# Patient Record
Sex: FEMALE | Race: WHITE | NOT HISPANIC OR LATINO | Employment: OTHER | URBAN - METROPOLITAN AREA
[De-identification: names, ages, dates, MRNs, and addresses within clinical notes are randomized per-mention and may not be internally consistent; named-entity substitution may affect disease eponyms.]

---

## 2017-01-31 ENCOUNTER — COMMUNICATION - HEALTHEAST (OUTPATIENT)
Dept: MIDWIFE SERVICES | Facility: CLINIC | Age: 52
End: 2017-01-31

## 2017-01-31 ENCOUNTER — AMBULATORY - HEALTHEAST (OUTPATIENT)
Dept: MIDWIFE SERVICES | Facility: CLINIC | Age: 52
End: 2017-01-31

## 2017-06-26 ENCOUNTER — COMMUNICATION - HEALTHEAST (OUTPATIENT)
Dept: ADMINISTRATIVE | Facility: CLINIC | Age: 52
End: 2017-06-26

## 2017-07-03 ENCOUNTER — OFFICE VISIT - HEALTHEAST (OUTPATIENT)
Dept: MIDWIFE SERVICES | Facility: CLINIC | Age: 52
End: 2017-07-03

## 2017-07-03 DIAGNOSIS — N93.9 VAGINAL SPOTTING: ICD-10-CM

## 2017-07-03 ASSESSMENT — MIFFLIN-ST. JEOR: SCORE: 1426.53

## 2017-07-05 ENCOUNTER — AMBULATORY - HEALTHEAST (OUTPATIENT)
Dept: MIDWIFE SERVICES | Facility: CLINIC | Age: 52
End: 2017-07-05

## 2017-07-05 DIAGNOSIS — N92.0 INTERMENSTRUAL SPOTTING: ICD-10-CM

## 2017-07-07 ENCOUNTER — HOSPITAL ENCOUNTER (OUTPATIENT)
Dept: ULTRASOUND IMAGING | Facility: CLINIC | Age: 52
Discharge: HOME | End: 2017-07-07
Attending: MIDWIFE

## 2017-07-07 DIAGNOSIS — N92.0 EXCESSIVE AND FREQUENT MENSTRUATION WITH REGULAR CYCLE: ICD-10-CM

## 2017-07-07 DIAGNOSIS — N92.0 INTERMENSTRUAL SPOTTING: ICD-10-CM

## 2017-07-08 ENCOUNTER — COMMUNICATION - HEALTHEAST (OUTPATIENT)
Dept: OBGYN | Facility: CLINIC | Age: 52
End: 2017-07-08

## 2017-07-27 ENCOUNTER — OFFICE VISIT - HEALTHEAST (OUTPATIENT)
Dept: OBGYN | Facility: CLINIC | Age: 52
End: 2017-07-27

## 2017-07-27 DIAGNOSIS — N92.1 METRORRHAGIA: ICD-10-CM

## 2017-07-27 ASSESSMENT — MIFFLIN-ST. JEOR: SCORE: 1435.6

## 2018-01-22 ENCOUNTER — RECORDS - HEALTHEAST (OUTPATIENT)
Dept: LAB | Facility: CLINIC | Age: 53
End: 2018-01-22

## 2018-01-22 LAB
ALBUMIN SERPL-MCNC: 4 G/DL (ref 3.5–5)
ALP SERPL-CCNC: 47 U/L (ref 45–120)
ALT SERPL W P-5'-P-CCNC: 18 U/L (ref 0–45)
ANION GAP SERPL CALCULATED.3IONS-SCNC: 6 MMOL/L (ref 5–18)
AST SERPL W P-5'-P-CCNC: 16 U/L (ref 0–40)
BILIRUB SERPL-MCNC: 0.6 MG/DL (ref 0–1)
BUN SERPL-MCNC: 11 MG/DL (ref 8–22)
CALCIUM SERPL-MCNC: 9.2 MG/DL (ref 8.5–10.5)
CHLORIDE BLD-SCNC: 104 MMOL/L (ref 98–107)
CHOLEST SERPL-MCNC: 226 MG/DL
CO2 SERPL-SCNC: 29 MMOL/L (ref 22–31)
CREAT SERPL-MCNC: 0.7 MG/DL (ref 0.6–1.1)
FASTING STATUS PATIENT QL REPORTED: ABNORMAL
GFR SERPL CREATININE-BSD FRML MDRD: >60 ML/MIN/1.73M2
GLUCOSE BLD-MCNC: 90 MG/DL (ref 70–125)
HDLC SERPL-MCNC: 66 MG/DL
LDLC SERPL CALC-MCNC: 134 MG/DL
LIPASE SERPL-CCNC: 31 U/L (ref 0–52)
POTASSIUM BLD-SCNC: 4.4 MMOL/L (ref 3.5–5)
PROT SERPL-MCNC: 7.2 G/DL (ref 6–8)
SODIUM SERPL-SCNC: 139 MMOL/L (ref 136–145)
TRIGL SERPL-MCNC: 132 MG/DL

## 2018-01-30 ENCOUNTER — RECORDS - HEALTHEAST (OUTPATIENT)
Dept: ADMINISTRATIVE | Facility: OTHER | Age: 53
End: 2018-01-30

## 2018-02-08 ENCOUNTER — HOSPITAL ENCOUNTER (OUTPATIENT)
Dept: NUCLEAR MEDICINE | Facility: CLINIC | Age: 53
Discharge: HOME | End: 2018-02-08
Attending: FAMILY MEDICINE

## 2018-02-08 DIAGNOSIS — R10.10 UPPER ABDOMINAL PAIN: ICD-10-CM

## 2018-04-24 ENCOUNTER — AMBULATORY - HEALTHEAST (OUTPATIENT)
Dept: MIDWIFE SERVICES | Facility: CLINIC | Age: 53
End: 2018-04-24

## 2018-04-24 ENCOUNTER — COMMUNICATION - HEALTHEAST (OUTPATIENT)
Dept: MIDWIFE SERVICES | Facility: CLINIC | Age: 53
End: 2018-04-24

## 2019-04-30 ENCOUNTER — COMMUNICATION - HEALTHEAST (OUTPATIENT)
Dept: MIDWIFE SERVICES | Facility: CLINIC | Age: 54
End: 2019-04-30

## 2019-05-02 ENCOUNTER — COMMUNICATION - HEALTHEAST (OUTPATIENT)
Dept: MIDWIFE SERVICES | Facility: CLINIC | Age: 54
End: 2019-05-02

## 2019-05-02 ENCOUNTER — AMBULATORY - HEALTHEAST (OUTPATIENT)
Dept: OBGYN | Facility: CLINIC | Age: 54
End: 2019-05-02

## 2019-05-02 DIAGNOSIS — B37.31 YEAST INFECTION OF THE VAGINA: ICD-10-CM

## 2019-05-13 ENCOUNTER — COMMUNICATION - HEALTHEAST (OUTPATIENT)
Dept: ADMINISTRATIVE | Facility: CLINIC | Age: 54
End: 2019-05-13

## 2019-06-03 ENCOUNTER — OFFICE VISIT - HEALTHEAST (OUTPATIENT)
Dept: MIDWIFE SERVICES | Facility: CLINIC | Age: 54
End: 2019-06-03

## 2019-06-03 DIAGNOSIS — B37.31 VAGINAL YEAST INFECTION: ICD-10-CM

## 2019-06-03 DIAGNOSIS — Z01.419 ENCOUNTER FOR GYNECOLOGICAL EXAMINATION: ICD-10-CM

## 2019-06-03 DIAGNOSIS — Z23 NEED FOR VACCINATION: ICD-10-CM

## 2019-06-03 LAB
CLUE CELLS: ABNORMAL
TRICHOMONAS, WET PREP: ABNORMAL
YEAST, WET PREP: ABNORMAL

## 2019-06-03 ASSESSMENT — MIFFLIN-ST. JEOR: SCORE: 1421.99

## 2019-06-04 ENCOUNTER — COMMUNICATION - HEALTHEAST (OUTPATIENT)
Dept: MIDWIFE SERVICES | Facility: CLINIC | Age: 54
End: 2019-06-04

## 2019-06-04 LAB
HPV SOURCE: NORMAL
HUMAN PAPILLOMA VIRUS 16 DNA: NEGATIVE
HUMAN PAPILLOMA VIRUS 18 DNA: NEGATIVE
HUMAN PAPILLOMA VIRUS FINAL DIAGNOSIS: NORMAL
HUMAN PAPILLOMA VIRUS OTHER HR: NEGATIVE
SPECIMEN DESCRIPTION: NORMAL

## 2019-12-19 ENCOUNTER — OFFICE VISIT - HEALTHEAST (OUTPATIENT)
Dept: MIDWIFE SERVICES | Facility: CLINIC | Age: 54
End: 2019-12-19

## 2019-12-19 DIAGNOSIS — Z80.0 FAMILY HISTORY OF COLON CANCER: ICD-10-CM

## 2019-12-19 DIAGNOSIS — R10.32 LEFT LOWER QUADRANT PAIN: ICD-10-CM

## 2019-12-19 DIAGNOSIS — G47.09 OTHER INSOMNIA: ICD-10-CM

## 2019-12-19 DIAGNOSIS — Z80.3 FAMILY HISTORY OF BREAST CANCER: ICD-10-CM

## 2019-12-19 ASSESSMENT — MIFFLIN-ST. JEOR: SCORE: 1440.14

## 2019-12-20 ENCOUNTER — HOSPITAL ENCOUNTER (OUTPATIENT)
Dept: ULTRASOUND IMAGING | Facility: CLINIC | Age: 54
Discharge: HOME | End: 2019-12-20
Attending: ADVANCED PRACTICE MIDWIFE

## 2019-12-20 DIAGNOSIS — R10.32 LEFT LOWER QUADRANT PAIN: ICD-10-CM

## 2020-01-02 ENCOUNTER — RECORDS - HEALTHEAST (OUTPATIENT)
Dept: MAMMOGRAPHY | Facility: CLINIC | Age: 55
End: 2020-01-02

## 2020-01-02 DIAGNOSIS — Z80.3 FAMILY HISTORY OF MALIGNANT NEOPLASM OF BREAST: ICD-10-CM

## 2020-02-04 ENCOUNTER — RECORDS - HEALTHEAST (OUTPATIENT)
Dept: ADMINISTRATIVE | Facility: OTHER | Age: 55
End: 2020-02-04

## 2021-01-20 ENCOUNTER — AMBULATORY - HEALTHEAST (OUTPATIENT)
Dept: NURSING | Facility: CLINIC | Age: 56
End: 2021-01-20

## 2021-02-10 ENCOUNTER — AMBULATORY - HEALTHEAST (OUTPATIENT)
Dept: NURSING | Facility: CLINIC | Age: 56
End: 2021-02-10

## 2021-05-28 NOTE — TELEPHONE ENCOUNTER
Name of caller: Patient  Phone number where you may be reached: [phone]  Reason for call: Pt gets her physicals from her PCP but would like to know if she is due for a pap. Pt's next availability is 6/3 and she prefers to see MH.  Best time to call back: any  If we don't reach you, is it okay to leave a detailed message? yes

## 2021-05-28 NOTE — TELEPHONE ENCOUNTER
Pt returning call from [facility]. She is available for a call until 12 pm today and then from 1:30 - 3:30 pm. Please call pt at [phone]

## 2021-05-28 NOTE — TELEPHONE ENCOUNTER
TC: Called Carolyne, 53 yo  in response to her email to Tita Luis requesting a prescription for Diflucan.  It appears as though she sent a request on the  and Tita sent a prescription of Diflucan with 4 refills to her pharmacy - does she need another prescription of Diflucan?  Has she received the original prescription?  Does she have symptoms that she is concerned about and would like to come to clinic to discuss?    Left message for her to call this writer back to clarify.    ANIBAL Lindquist,CNM

## 2021-05-29 NOTE — PROGRESS NOTES
"  Assessment:   External labia itching and discomfort  Aestrogenic cervix and vaginal tissue  Irregular intermenstrual spotting  History of cervical polyps  Vaginal yeast infection  Pap smear collected     Plan:   Wet prep collected and positive for yeast.  Rx for diflucan 150 mg orally to be taken once.  If no relief in symptoms, may repeat dosing in 24-48 hours.    Discussed treatment of labial irritation.  Unsure if it's related to vaginal yeast infection (most likely) or decrease in Estrogen.  Advised trying oral diflucan treatment to see if labial discomfort resolves.  If not, may consider Premarin external vaginal cream 0.5g daily x 2 weeks, then change to twice weekly until resolution of symptoms. Advised waiting to start Premarin until after vaginal yeast is treated to establish if discomfort is resolved.  Pap smear collected, pending results.  Return to PCP for annual wellness care, encouraged Carolyne to check in with CNM if vaginal discomfort is not resolved, and we can revisit trying external estrogen treatment for symptoms.     Tita Smith, ANIBAL,CNM    TT with pt 20 min, >50% TSC and CC.      Subjective:      Irma Aldrich is a 54 y.o. female who presents today for repeat pap smear.  She does see Dr. Darnell Wilson for primary care, and has wellness exams with him.  She desires pap smear today, and would like to discuss generalized vaginal/labial itching and discomfort.  She has a history of recurrent yeast infections, and has treated them successfully with diflucan.  She denies any vaginal itching symptoms currently, she only notes a \"general labia irritation\".  She has not tried any yeast treatment or other topical treatment for labia. She has a regular monthly period, and does report intermenstrual spotting occasionally.  She does have a history of cervical polypectomy.  She denies spotting after IC.  She is in a mutually monogamous relationship with her .  She declines STI testing today.  "     Review of Systems  Pertinent items are noted in HPI.  A 12 point comprehensive review of systems was negative except as noted.      Objective:   External labia appears red and slightly irritated, with small area of excoriation on left labia.   Vaginal tissue is pale pink, with minimal rugae.  Very small amount of white adherent discharge noted on left vaginal wall.  Thin, white discharge noted in posterior fornix. No malodor noted.  SSE reveals cervix midline.  Cervical os and cervix appear aestrogenic, white and friable with pap smear collection. No polyps, lesions or masses noted on cervix.    Bimanual reveals anteverted uterus, non pregnant in size, adnexa appreciated bilaterally and were without masses or lesions.  Negative CMT.    Laboratory:   Wet prep: positive for yeast  Pap and HPV: pending

## 2021-05-31 VITALS — BODY MASS INDEX: 30.66 KG/M2 | HEIGHT: 65 IN | WEIGHT: 184 LBS

## 2021-05-31 VITALS — HEIGHT: 65 IN | BODY MASS INDEX: 30.99 KG/M2 | WEIGHT: 186 LBS

## 2021-06-01 ENCOUNTER — RECORDS - HEALTHEAST (OUTPATIENT)
Dept: LAB | Facility: CLINIC | Age: 56
End: 2021-06-01

## 2021-06-01 LAB
CHOLEST SERPL-MCNC: 220 MG/DL
FASTING STATUS PATIENT QL REPORTED: NO
HDLC SERPL-MCNC: 68 MG/DL
LDLC SERPL CALC-MCNC: 123 MG/DL
TRIGL SERPL-MCNC: 147 MG/DL

## 2021-06-02 VITALS — HEIGHT: 65 IN | BODY MASS INDEX: 30.49 KG/M2 | WEIGHT: 183 LBS

## 2021-06-03 VITALS
DIASTOLIC BLOOD PRESSURE: 70 MMHG | BODY MASS INDEX: 31.16 KG/M2 | SYSTOLIC BLOOD PRESSURE: 100 MMHG | HEIGHT: 65 IN | HEART RATE: 64 BPM | WEIGHT: 187 LBS

## 2021-06-04 NOTE — PROGRESS NOTES
Office Visit          Assessment/Plan:  1)  55 yo  with LLQ pain x 4 days  -Discussed that the timing of her pain could be mittelschmerz, however Carolyne has never had Mittelschermz before.   -Carolyne does not recall which side she had her ectopic and adhesion removal   -Discussed that Carolyne could be at risk for ovarian cancer given her family hx of breast cancer.   -Pelvic US ordered to evaluate LLQ pain and ovary for abnormal masses.   2)  Family hx of colon cancer, Carolyne is not sure when she last had a colonoscopy. Order placed for a colonoscopy. She has family hx of colon cancer needs colonoscopy ever 5 years.   3.) Irregular menstrual cycles: Discussed the normal progression with menstrual cycles in perimenopause. Emphasized the importance tracking her bleeding carefully and to let us know if she has any intramenstrual spotting. If this is the case she will need an endometrial biopsy.   -reviewed normal s/sx of perimenopause/menopause  4.) Insomnia: Reviewed sleep hygiene and recommendations to promote healthy sleep habits. Carolyne will continue with melatonin during her perimenopausal transition, then see if she still needs it.   5.) Family hx of breast cancer: MGM dx with breast cancer in her 30's and . Carolyne's last mammogram was in 2016. Advised that she get a mammogram. Order placed.     Subjective:    Irma is a 54 y.o. female who presents for evaluation of left lower quadrant pain.    LMP: 19, heavy spotting for a few days. Carolyne reports her cycles have become more irregular this last year. She has not been tracking her cycles and is not sure if she is having any intramenstrual spotting.   Sister 51 is going through perimenopause, she is having hot flashes  Carolyne developed insomnia a year ago, she is taking melatonin for this, which has been effective.   Carolyne denies any family hx of ovarian cancer  Carolyne has a history of an ectopic and had a surgery to remove multiple adhesions. She doesn't  "remember which side it is on.  Maternal aunt x with colon cancer in her 40's and . Carolyne does not remember when she last had a colonoscopy  MGM developed breast cancer in her 30's and . Carolyne's last mammogram was , she is over due for her mammogram  Sees BRADEN Wilson for her annual examinatons  Currently sexually active with one partner x 30 years, not concerned re STDs  Carolyne developed left lower quadrant pain that is constant and worse with palpation for the last 4 days. Of note she is 2 weeks post LMP (timing of ovulation). She has never had ovulatory pain before and is concerned. She reports normal bowel movements. Denies abnormal vaginal discharge, itching, irritation, odor, dyspareunia, or dysuria.      Review of Systems:  Also a 12 point comprehensive review of systems was negative except as noted.     Objective:   Vitals:    Vitals:    19 0903   BP: 100/70   Pulse: 64   Weight: 187 lb (84.8 kg)   Height: 5' 4.75\" (1.645 m)       Physical Exam:  General: Pleasant, articulate, well-groomed, well-nourished female.  Not in any apparent distress.  Neck: Supple.  Lungs:  nonlabored breathing pattern.  Abdomen: Soft, no masses, negative CVAT. Pain in LLQ with deep palpation   External genitalia: Normal hair distribution, no lesions.  Urethral opening: Without lesions, or tenderness.   Bladder: Without masses, or tenderness.  Vagina: Pink, rugated, normal-appearing discharge.  Cervix: two nabothian cysts at 12 and 2 o'clock, approximately 4mm wide, pink, smooth.   Bimanual: Finds uterus small, mobile, nontender, no masses.  Negative CMT.  Adnexa: R: no masses palpated, no tenderness; L: slightly more full, no distinct mass palpated, tender.    Lower extremities: +1 reflexes, no significant edema.        TT = 45 minutes of time of which >50% on education/counseling/care-coordination   "

## 2021-06-08 NOTE — PROGRESS NOTES
Patient called with complaint of yeast infection.  No changes in medications, would like oral treatment for yeast. Rx faxed to pharmacy.

## 2021-06-11 NOTE — PROGRESS NOTES
"A: Intermenstrual spotting      Cervical lesions      Nabothian cyst    P:  Discussed concerns with lesions and symptoms.  Strongly encouraged patient to rule out STI infection, although risk for patient is very low, she does consent to GC/CT which was collected via dirty urine specimen.  Referral placed for Dr. Mccain to evaluate cervical appearance and treat pending diagnosis.  Patient agrees with plan, given phone number to schedule appointment with Dr. Mccain.    S: Carolyne presents today for evaluation of irregular vaginal spotting that began approximately 2 months ago.  She has regular periods every 30 days, lasting for 4 days.  For the past two months she has spotted for 7-10 days between periods with slight increase in spotting after intercourse.  The spotting is brown in color, and not enough to soak a pad.  She denies cramping with spotting.  She currently uses condoms for birth control.  She is a monogamous relationship with her .  She has history of chlamydia \"many years ago\".  She is a .  Her children are 15 and 17 years old.  She had an ectopic pregnancy in  and underwent a left salpingectomy.  She had a myomectomy in .  In , she had an ultrasound to evaluate IUD placement, and an incidental finding of clustered cystic lesions without vascularity in the endocervical canal (possible nabothian cyst) was noted.  In , she also had a polypectomy.  Pathology showed endocervical polyp with focal erosion and chronic acute cervicitis.  Her pap smear history is as follows:  Negative pap; 2011 Negative pap, negative HPV; 14 Negative pap, Negative HPV.    O:  Speculum exam reveals cervix to be multiparous and pink, glistening.  No cervical discharge or vaginal discharge is noted.  There are 3 lesions noted.  Lesions appear bright red, and do not valerie with pressure.  Cotton swab used to cleanse cervix, and brown discharge noted when wiping cervix.  Lesions are " approximately 1 to 3 mm in width and length.  Locations are approximately 1 cm from os, clustered on left side at 1 o'clock, 3 o'clock and 4 o'clock.  At 1 o'clock, the lesion appears to be near a nabothian cyst that is opaque with some vascularity noted behind the cyst.  Bimanual exam reveals cervix is midline without CMT.  Adnexa grossly WNL, no masses palpated.  Uterus is midline, slightly anteverted.      TT with pt 20 min, >50% TSC

## 2021-06-12 NOTE — PROGRESS NOTES
CC: The patient is being seen as a consult from Tita Smith CNM, secondary to cervical lesions.    HPI: The pt is a 52 y.o. MWF  who presents with a history of 3 erythematous lesions seen on her cervix on exam done 7/3/17 secondary to some vaginal spotting.  She had an ultrasound done on  with a 3 mm endometrium and a 1.6 x 1.8 x 1.5 cm left subserosal fibroid.  The right ovary was normal; the left had a 1.7 x 1.2 cm follicular cyst.  She has stopped bleeding since the ultrasound.    Past Medical History:   Diagnosis Date     Chlamydia      Endocervical polyp     polypectomy      Fibroid     myomectomy in  (8 weeks postpartum)       Past Surgical History:   Procedure Laterality Date     CERVICAL POLYPECTOMY       MYOMECTOMY       SALPINGECTOMY Left 1998    ectopic pregnancy       Patient's   Family History   Problem Relation Age of Onset     Kidney cancer Mother      Thyroid cancer Father      radiation exposure     Lung cancer Maternal Grandmother      Breast cancer Maternal Grandmother       in late 30s     Brain cancer Maternal Grandfather      Colon cancer Maternal Aunt        Patient   Social History     Social History     Marital status:      Spouse name: N/A     Number of children: N/A     Years of education: N/A     Social History Main Topics     Smoking status: Never Smoker     Smokeless tobacco: Never Used     Alcohol use Yes     Drug use: No     Sexual activity: Yes     Partners: Male     Birth control/ protection: Condom     Other Topics Concern     None     Social History Narrative       Current Outpatient Prescriptions   Medication Sig Dispense Refill     cetirizine (ZYRTEC) 10 MG tablet Take 10 mg by mouth as needed for allergies.       cholecalciferol, vitamin D3, (VITAMIN D3) 1,000 unit capsule Take 1,000 Units by mouth daily.       No current facility-administered medications for this visit.        Patient is allergic to amoxicillin and doxycycline  "hyclate.    ROS:  12 part ROS is negative aside from those symptoms in the HPI    PE:  /66 (Patient Site: Left Arm, Patient Position: Sitting, Cuff Size: Adult Regular)  Pulse 64  Ht 5' 4.75\" (1.645 m)  Wt 186 lb (84.4 kg)  LMP 06/26/2017  Breastfeeding? No  BMI 31.19 kg/m2          Body mass index is 31.19 kg/(m^2).    General: obese WF, NAD  Pelvic: EG/BUS no lesions, no skin change   Vagina no lesions, no discharge   Cervix no lesions, no cervical motion tenderness   Perineum no lesions   Rectal deferred  Psych: normal mood  Neuro: CN I-XII grossly intact  MS: normal gait    Assessment: 52 y.o. MWF  with a history of cervical lesions.    Plan: Natural history of Nabothian cysts discussed with the patient.  We discussed her ultrasound findings.  At this time I counseled her that no further evaluation is needed.  If her bleeding returns and is heavier, she should be re-evaluated.    Approximately 15 minutes were spent with the patient with the majority in counseling.    "

## 2021-06-17 ENCOUNTER — THERAPY VISIT (OUTPATIENT)
Dept: PHYSICAL THERAPY | Facility: CLINIC | Age: 56
End: 2021-06-17
Payer: COMMERCIAL

## 2021-06-17 DIAGNOSIS — M25.512 SHOULDER PAIN, LEFT: ICD-10-CM

## 2021-06-17 PROCEDURE — 97112 NEUROMUSCULAR REEDUCATION: CPT | Mod: GP | Performed by: PHYSICAL THERAPIST

## 2021-06-17 PROCEDURE — 97161 PT EVAL LOW COMPLEX 20 MIN: CPT | Mod: GP | Performed by: PHYSICAL THERAPIST

## 2021-06-17 PROCEDURE — 97110 THERAPEUTIC EXERCISES: CPT | Mod: GP | Performed by: PHYSICAL THERAPIST

## 2021-06-17 NOTE — PROGRESS NOTES
Physical Therapy Initial Evaluation  Subjective:  The history is provided by the patient.   Therapist Generated HPI Evaluation  Problem details: Pt reports an approximately 1 year history of L sided shoulder pain. Not sure what caused symptoms initially, reports slow gradual onset over time. Currently locates pain in posterolateral aspect of L shoulder. Describes pain as both achy and sharp. Pain worse with reaching up OH, putting on shirts, reaching forward, laying on L side, reaching back behind to unhook bra, lifting/carrying heavy objects, fixing hair. Pain better with Celebrex, topical creams, ice at times. No imaging done. No other treatment sought out, no other PT done. No past surgeries on shoulder. 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 Does once a month approximately. Pt goals involve reducing pain to sleep better. .         Type of problem:  Left shoulder.    This is a chronic condition.                                             Objective:  System                   Shoulder Evaluation:  ROM:  AROM:    Flexion:  Left:  WNL with 2/10 pain    Right:  WNLWNL     Abduction:  Left: WNL 2/10 pain   Right:  WNL                Flexion/External Rotation:  Left:  T2    Right:  T2  Extension/Internal Rotation:  Left:  T8    Right:  T8          Strength:  : Notable painful with ER at 90* abduction.  Flexion: Left:5-/5    Pain: +    Right: 5-/5     Pain:     Abduction:  Left: 5-/5  Pain:    Right: 5-/5      Pain:+    Internal Rotation:  Left:5-/5      Pain:-/+    Right: 5-/5     Pain:  External Rotation:   Left:5-/5     Pain:   Right:5-/5      Pain:-/+              Special Tests:    Left shoulder positive for the following special tests:  Impingement      Mobility Tests:    Glenohumeral anterior left:  Hypermobile    Glenohumeral posterior left:  Hypermobile    Glenohumeral inferior left:  Hypermobile          Scapulohumeral rhythm right:  Hypomobile                                      General     ROS    Assessment/Plan:    Patient is a 56 year old female with left side shoulder complaints.    Patient has the following significant findings with corresponding treatment plan.                Diagnosis 1:  L shoulder pain  Pain -  hot/cold therapy, US, electric stimulation, mechanical traction, manual therapy, splint/taping/bracing/orthotics, self management, education, directional preference exercise and home program  Decreased ROM/flexibility - manual therapy, therapeutic exercise, therapeutic activity and home program  Decreased strength - therapeutic exercise, therapeutic activities and home program  Impaired muscle performance - neuro re-education and home program  Decreased function - therapeutic activities and home program    Therapy Evaluation Codes:   1) History comprised of:   Personal factors that impact the plan of care:      None.    Comorbidity factors that impact the plan of care are:      Overweight.     Medications impacting care: Anti-inflammatory.  2) Examination of Body Systems comprised of:   Body structures and functions that impact the plan of care:      Shoulder.   Activity limitations that impact the plan of care are:      Bathing, Dressing, Lifting, Sleeping and Reaching.  3) Clinical presentation characteristics are:   Stable/Uncomplicated.  4) Decision-Making    Low complexity using standardized patient assessment instrument and/or measureable assessment of functional outcome.  Cumulative Therapy Evaluation is: Low complexity.    Previous and current functional limitations:  (See Goal Flow Sheet for this information)    Short term and Long term goals: (See Goal Flow Sheet for this information)     Communication ability:  Patient appears to be able to clearly communicate and understand verbal and written communication and follow directions correctly.  Treatment Explanation - The following has been discussed with the patient:   RX ordered/plan of care  Anticipated  outcomes  Possible risks and side effects  This patient would benefit from PT intervention to resume normal activities.   Rehab potential is good.    Frequency:  1 X week, once daily  Duration:  for 8 weeks  Discharge Plan:  Achieve all LTG.  Independent in home treatment program.  Reach maximal therapeutic benefit.    Please refer to the daily flowsheet for treatment today, total treatment time and time spent performing 1:1 timed codes.

## 2021-06-17 NOTE — LETTER
[facility]  [address] [place] [address]  [phone]    2021    Re: Irma Aldrich   :   1965  MRN:  [id number]   REFERRING PHYSICIAN:   Darnell [facility]DOVINOS [facility]  Date of Initial Evaluation:  21  Visits:  Rxs Used: 1  Reason for Referral:  Shoulder pain, left    EVALUATION SUMMARY    Physical Therapy Initial Evaluation  Subjective:  The history is provided by the patient.   Therapist Generated HPI Evaluation  Problem details: Pt reports an approximately 1 year history of L sided shoulder pain. Not sure what caused symptoms initially, reports slow gradual onset over time. Currently locates pain in posterolateral aspect of L shoulder. Describes pain as both achy and sharp. Pain worse with reaching up OH, putting on shirts, reaching forward, laying on L side, reaching back behind to unhook bra, lifting/carrying heavy objects, fixing hair. Pain better with Celebrex, topical creams, ice at times. No imaging done. No other treatment sought out, no other PT done. No past surgeries on shoulder. 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 Does once a month approximately. Pt goals involve reducing pain to sleep better. .         Type of problem:  Left shoulder.  This is a chronic condition.    Objective:  Shoulder Evaluation:  ROM:  AROM:    Flexion:  Left:  WNL with 2/10 pain    Right:  WNLWNL   Abduction:  Left: WNL 2/10 pain   Right:  WNL  Flexion/External Rotation:  Left:  T2    Right:  T2  Extension/Internal Rotation:  Left:  T8    Right:  T8    Strength:  : Notable painful with ER at 90* abduction.  Flexion: Left:5-/5    Pain: +    Right: 5-/5     Pain:   Abduction:  Left: 5-/5  Pain:    Right: 5-/5      Pain:+  Internal Rotation:  Left:5-/5      Pain:-/+    Right: 5-/5     Pain:  External Rotation:   Left:5-/5     Pain:   Right:5-/5      Pain:-/+     Special Tests:    Left shoulder positive for the following special tests:  Impingement  Mobility Tests:    Glenohumeral anterior left:  Hypermobile    Glenohumeral posterior left:  Hypermobile    Glenohumeral inferior left:  Hypermobile    Re: Irma Aldrich   :   1965    Scapulohumeral rhythm right:  Hypomobile         Assessment/Plan:    Patient is a 56 year old female with left side shoulder complaints.    Patient has the following significant findings with corresponding treatment plan.                Diagnosis 1:  L shoulder pain  Pain -  hot/cold therapy, US, electric stimulation, mechanical traction, manual therapy, splint/taping/bracing/orthotics, self management, education, directional preference exercise and home program  Decreased ROM/flexibility - manual therapy, therapeutic exercise, therapeutic activity and home program  Decreased strength - therapeutic exercise, therapeutic activities and home program  Impaired muscle performance - neuro re-education and home program  Decreased function - therapeutic activities and home program    Therapy Evaluation Codes:   1) History comprised of:   Personal factors that impact the plan of care:      None.    Comorbidity factors that impact the plan of care are:      Overweight.     Medications impacting care: Anti-inflammatory.  2) Examination of Body Systems comprised of:   Body structures and functions that impact the plan of care:      Shoulder.   Activity limitations that impact the plan of care are:      Bathing, Dressing, Lifting, Sleeping and Reaching.  3) Clinical presentation characteristics are:   Stable/Uncomplicated.  4) Decision-Making    Low complexity using standardized patient assessment instrument and/or measureable assessment of functional outcome.  Cumulative Therapy Evaluation is: Low complexity.    Previous and current functional limitations:  (See Goal Flow Sheet for this information)    Short term and Long term goals: (See Goal  Flow Sheet for this information)     Communication ability:  Patient appears to be able to clearly communicate and understand verbal and written communication and follow directions correctly.  Treatment Explanation - The following has been discussed with the patient:   RX ordered/plan of care  Anticipated outcomes  Possible risks and side effects  This patient would benefit from PT intervention to resume normal activities.   Rehab potential is good.    Frequency:  1 X week, once daily  Duration:  for 8 weeks  Discharge Plan:  Achieve all LTG.  Independent in home treatment program.  Reach maximal therapeutic benefit.    Re: Irma Aldrich   :   1965    Thank you for your referral.    INQUIRIES  Therapist: Joe Jameson DPT, CSCS [facility] [address] [place] [address]  Phone: [phone]  Fax: [phone]

## 2021-06-22 ENCOUNTER — THERAPY VISIT (OUTPATIENT)
Dept: PHYSICAL THERAPY | Facility: CLINIC | Age: 56
End: 2021-06-22
Payer: COMMERCIAL

## 2021-06-22 DIAGNOSIS — M25.512 SHOULDER PAIN, LEFT: ICD-10-CM

## 2021-06-22 PROCEDURE — 97112 NEUROMUSCULAR REEDUCATION: CPT | Mod: GP | Performed by: PHYSICAL THERAPIST

## 2021-06-22 PROCEDURE — 97110 THERAPEUTIC EXERCISES: CPT | Mod: GP | Performed by: PHYSICAL THERAPIST

## 2021-06-22 PROCEDURE — 97140 MANUAL THERAPY 1/> REGIONS: CPT | Mod: GP | Performed by: PHYSICAL THERAPIST

## 2021-07-10 ENCOUNTER — HEALTH MAINTENANCE LETTER (OUTPATIENT)
Age: 56
End: 2021-07-10

## 2021-07-13 ENCOUNTER — THERAPY VISIT (OUTPATIENT)
Dept: PHYSICAL THERAPY | Facility: CLINIC | Age: 56
End: 2021-07-13
Payer: COMMERCIAL

## 2021-07-13 DIAGNOSIS — M25.512 SHOULDER PAIN, LEFT: Primary | ICD-10-CM

## 2021-07-13 PROCEDURE — 97110 THERAPEUTIC EXERCISES: CPT | Mod: GP | Performed by: PHYSICAL THERAPIST

## 2021-07-13 PROCEDURE — 97112 NEUROMUSCULAR REEDUCATION: CPT | Mod: GP | Performed by: PHYSICAL THERAPIST

## 2021-07-13 PROCEDURE — 97140 MANUAL THERAPY 1/> REGIONS: CPT | Mod: GP | Performed by: PHYSICAL THERAPIST

## 2021-07-20 ENCOUNTER — THERAPY VISIT (OUTPATIENT)
Dept: PHYSICAL THERAPY | Facility: CLINIC | Age: 56
End: 2021-07-20
Payer: COMMERCIAL

## 2021-07-20 DIAGNOSIS — M25.512 SHOULDER PAIN, LEFT: ICD-10-CM

## 2021-07-20 PROCEDURE — 97110 THERAPEUTIC EXERCISES: CPT | Mod: GP | Performed by: PHYSICAL THERAPIST

## 2021-07-20 PROCEDURE — 97140 MANUAL THERAPY 1/> REGIONS: CPT | Mod: GP | Performed by: PHYSICAL THERAPIST

## 2021-07-20 PROCEDURE — 97112 NEUROMUSCULAR REEDUCATION: CPT | Mod: GP | Performed by: PHYSICAL THERAPIST

## 2021-08-04 ENCOUNTER — THERAPY VISIT (OUTPATIENT)
Dept: PHYSICAL THERAPY | Facility: CLINIC | Age: 56
End: 2021-08-04
Payer: COMMERCIAL

## 2021-08-04 DIAGNOSIS — M25.512 SHOULDER PAIN, LEFT: ICD-10-CM

## 2021-08-04 PROCEDURE — 97112 NEUROMUSCULAR REEDUCATION: CPT | Mod: GP | Performed by: PHYSICAL THERAPIST

## 2021-08-04 PROCEDURE — 97110 THERAPEUTIC EXERCISES: CPT | Mod: GP | Performed by: PHYSICAL THERAPIST

## 2021-09-01 ENCOUNTER — THERAPY VISIT (OUTPATIENT)
Dept: PHYSICAL THERAPY | Facility: CLINIC | Age: 56
End: 2021-09-01
Payer: COMMERCIAL

## 2021-09-01 DIAGNOSIS — M25.512 SHOULDER PAIN, LEFT: ICD-10-CM

## 2021-09-01 PROCEDURE — 97112 NEUROMUSCULAR REEDUCATION: CPT | Mod: GP | Performed by: PHYSICAL THERAPIST

## 2021-09-01 PROCEDURE — 97110 THERAPEUTIC EXERCISES: CPT | Mod: GP | Performed by: PHYSICAL THERAPIST

## 2021-09-04 ENCOUNTER — HEALTH MAINTENANCE LETTER (OUTPATIENT)
Age: 56
End: 2021-09-04

## 2021-09-13 ENCOUNTER — THERAPY VISIT (OUTPATIENT)
Dept: PHYSICAL THERAPY | Facility: CLINIC | Age: 56
End: 2021-09-13
Payer: COMMERCIAL

## 2021-09-13 DIAGNOSIS — M25.512 SHOULDER PAIN, LEFT: ICD-10-CM

## 2021-09-13 PROCEDURE — 97112 NEUROMUSCULAR REEDUCATION: CPT | Mod: GP | Performed by: PHYSICAL THERAPIST

## 2021-09-13 PROCEDURE — 97110 THERAPEUTIC EXERCISES: CPT | Mod: GP | Performed by: PHYSICAL THERAPIST

## 2021-09-21 ENCOUNTER — THERAPY VISIT (OUTPATIENT)
Dept: PHYSICAL THERAPY | Facility: CLINIC | Age: 56
End: 2021-09-21
Payer: COMMERCIAL

## 2021-09-21 DIAGNOSIS — H81.10 BPPV (BENIGN PAROXYSMAL POSITIONAL VERTIGO): Primary | ICD-10-CM

## 2021-09-21 PROCEDURE — 97161 PT EVAL LOW COMPLEX 20 MIN: CPT | Mod: GP | Performed by: PHYSICAL THERAPIST

## 2021-09-21 PROCEDURE — 95992 CANALITH REPOSITIONING PROC: CPT | Mod: GP | Performed by: PHYSICAL THERAPIST

## 2021-09-22 ASSESSMENT — ENCOUNTER SYMPTOMS
DIZZINESS: 1
SENSORY CHANGE: 0
TINGLING: 0

## 2021-09-22 NOTE — PROGRESS NOTES
Physical Therapy Initial Evaluation  Subjective:    Therapist Generated HPI Evaluation  Problem details: Pt presents to PT with a chief complaint of positional vertigo with reported onset ~1 week while turning in bed to the right. Pt reports self treatment with epley maneuvers which has helped but patient still has complaints of positional vertigo with rolling the right. Vertigo reported to be intense but brief (lasts 10-20 sec). Pt also reports a general feeling of dizziness/nausea) after the intense vertigo episodes. Pt currently being treated in PT for shoulder pain but unable to complete HEP due to vertigo. .     Affected Side: Vertigo.   This is a new condition.  Where condition occurred: at home.        Site of Pain: n/a. Pain quality: n/a.  Radiates to: n/a.    Associated symptoms:  Dizziness.  Symptoms are exacerbated by moving head and transfers  and relieved by rest.                      Since onset symptoms are gradually improving.          Restrictions due to condition include:  Working in normal job without restrictions.    Barriers include:  None as reported by patient.                        Objective:  System              Cervical/Thoracic Evaluation  Cervical AROM: normal (Painfree WFL Cervical AROM )                                                                     General     Review of Systems   HENT: Negative for ear pain, hearing loss and tinnitus.    Neurological: Positive for dizziness. Negative for tingling and sensory change.     VESTIBULAR EXAM:     Positive R hetal hallpike with upbeat nystagmus for ~20 sec (reported to be severe per patient)    Improved with R CRT for post canal     Negative Head shaking nystagmus    Assessment/Plan:    Patient is a 56 year old female with Vertigo complaints.    Patient has the following significant findings with corresponding treatment plan.                Diagnosis 1:  BPPV  Decreased function - therapeutic activities  Vertigo - CRT and  Neuroreeducation    Therapy Evaluation Codes:   Cumulative Therapy Evaluation is: Low complexity.    Previous and current functional limitations:  (See Goal Flow Sheet for this information)    Short term and Long term goals: (See Goal Flow Sheet for this information)     Communication ability:  Patient appears to be able to clearly communicate and understand verbal and written communication and follow directions correctly.  Treatment Explanation - The following has been discussed with the patient:   RX ordered/plan of care  Anticipated outcomes  Possible risks and side effects  This patient would benefit from PT intervention to resume normal activities.   Rehab potential is good.    Frequency:  2 X week, once daily  Duration:  for 2 weeks then 1 x week for 4 weeks  Discharge Plan:  Achieve all LTG.  Independent in home treatment program.  Reach maximal therapeutic benefit.    Please refer to the daily flowsheet for treatment today, total treatment time and time spent performing 1:1 timed codes.

## 2021-09-24 ENCOUNTER — THERAPY VISIT (OUTPATIENT)
Dept: PHYSICAL THERAPY | Facility: CLINIC | Age: 56
End: 2021-09-24
Payer: COMMERCIAL

## 2021-09-24 DIAGNOSIS — H81.11 BENIGN PAROXYSMAL POSITIONAL VERTIGO OF RIGHT EAR: Primary | ICD-10-CM

## 2021-09-24 PROCEDURE — 97530 THERAPEUTIC ACTIVITIES: CPT | Mod: GP | Performed by: PHYSICAL THERAPIST

## 2021-09-24 PROCEDURE — 97112 NEUROMUSCULAR REEDUCATION: CPT | Mod: GP | Performed by: PHYSICAL THERAPIST

## 2021-09-27 ENCOUNTER — THERAPY VISIT (OUTPATIENT)
Dept: PHYSICAL THERAPY | Facility: CLINIC | Age: 56
End: 2021-09-27
Payer: COMMERCIAL

## 2021-09-27 DIAGNOSIS — M25.512 SHOULDER PAIN, LEFT: ICD-10-CM

## 2021-09-27 PROCEDURE — 97530 THERAPEUTIC ACTIVITIES: CPT | Mod: GP | Performed by: PHYSICAL THERAPIST

## 2021-09-27 PROCEDURE — 97112 NEUROMUSCULAR REEDUCATION: CPT | Mod: GP | Performed by: PHYSICAL THERAPIST

## 2021-09-27 PROCEDURE — 97110 THERAPEUTIC EXERCISES: CPT | Mod: GP | Performed by: PHYSICAL THERAPIST

## 2021-10-25 ENCOUNTER — THERAPY VISIT (OUTPATIENT)
Dept: PHYSICAL THERAPY | Facility: CLINIC | Age: 56
End: 2021-10-25
Payer: COMMERCIAL

## 2021-10-25 DIAGNOSIS — M25.512 SHOULDER PAIN, LEFT: ICD-10-CM

## 2021-10-25 PROCEDURE — 97110 THERAPEUTIC EXERCISES: CPT | Mod: GP | Performed by: PHYSICAL THERAPIST

## 2021-10-25 PROCEDURE — 97112 NEUROMUSCULAR REEDUCATION: CPT | Mod: GP | Performed by: PHYSICAL THERAPIST

## 2021-10-25 NOTE — PROGRESS NOTES
PROGRESS  REPORT    Progress reporting period is from 9/13/21 to 10/25/21.       SUBJECTIVE  Subjective changes noted by patient:   Carolyne reports she has been forgetting to do the exercises because her shoulder has been feeling much better. She reports a % improvement in symptoms. She has only had mild pain this week due to having a COVID booster.     Current pain level is  1/10 following COVID-19 booster shot, 0/10 prior to this.  Previous pain level was 4/10.   Changes in function:  Yes (See Goal flowsheet attached for changes in current functional level)  Adverse reaction to treatment or activity: None    OBJECTIVE  Changes noted in objective findings:  Yes,   Objective: Shoulder: Flexion 165 B,  on L and 170 on R, ER 80 on L, 70 on R, EXT 53 B      ASSESSMENT/PLAN  Updated problem list and treatment plan: Diagnosis 1:   L shoulder pain  Decreased strength - home program  Decreased function - home program  Impaired posture - home program  STG/LTGs have been met or progress has been made towards goals:  Yes (See Goal flow sheet completed today.)  Assessment of Progress: The patient's condition is improving.  Self Management Plans:  Patient is independent in a home treatment program.  I have re-evaluated this patient and find that the nature, scope, duration and intensity of the therapy is appropriate for the medical condition of the patient.  Irma continues to require the following intervention to meet STG and LTG's:  PT intervention is no longer required to meet STG/LTG.    Recommendations:  This patient is ready to be discharged from therapy and continue their home treatment program.    Please refer to the daily flowsheet for treatment today, total treatment time and time spent performing 1:1 timed codes.

## 2021-11-02 ENCOUNTER — ANCILLARY PROCEDURE (OUTPATIENT)
Dept: MAMMOGRAPHY | Facility: CLINIC | Age: 56
End: 2021-11-02
Attending: FAMILY MEDICINE
Payer: COMMERCIAL

## 2021-11-02 DIAGNOSIS — Z12.31 VISIT FOR SCREENING MAMMOGRAM: ICD-10-CM

## 2021-11-02 PROCEDURE — 77067 SCR MAMMO BI INCL CAD: CPT | Mod: TC | Performed by: RADIOLOGY

## 2021-11-02 PROCEDURE — 77063 BREAST TOMOSYNTHESIS BI: CPT | Mod: TC | Performed by: RADIOLOGY

## 2022-08-06 ENCOUNTER — HEALTH MAINTENANCE LETTER (OUTPATIENT)
Age: 57
End: 2022-08-06

## 2022-10-16 ENCOUNTER — HEALTH MAINTENANCE LETTER (OUTPATIENT)
Age: 57
End: 2022-10-16

## 2022-11-03 ENCOUNTER — ANCILLARY PROCEDURE (OUTPATIENT)
Dept: MAMMOGRAPHY | Facility: CLINIC | Age: 57
End: 2022-11-03
Attending: FAMILY MEDICINE
Payer: COMMERCIAL

## 2022-11-03 DIAGNOSIS — Z12.31 VISIT FOR SCREENING MAMMOGRAM: ICD-10-CM

## 2022-11-03 PROCEDURE — 77063 BREAST TOMOSYNTHESIS BI: CPT | Mod: TC | Performed by: RADIOLOGY

## 2022-11-03 PROCEDURE — 77067 SCR MAMMO BI INCL CAD: CPT | Mod: TC | Performed by: RADIOLOGY

## 2023-08-26 ENCOUNTER — HEALTH MAINTENANCE LETTER (OUTPATIENT)
Age: 58
End: 2023-08-26

## 2023-09-12 ENCOUNTER — OFFICE VISIT (OUTPATIENT)
Dept: URGENT CARE | Facility: URGENT CARE | Age: 58
End: 2023-09-12
Payer: COMMERCIAL

## 2023-09-12 VITALS
HEIGHT: 65 IN | BODY MASS INDEX: 31.32 KG/M2 | TEMPERATURE: 98.5 F | SYSTOLIC BLOOD PRESSURE: 103 MMHG | HEART RATE: 66 BPM | DIASTOLIC BLOOD PRESSURE: 73 MMHG | WEIGHT: 188 LBS | OXYGEN SATURATION: 97 %

## 2023-09-12 DIAGNOSIS — R10.32 LLQ ABDOMINAL PAIN: ICD-10-CM

## 2023-09-12 DIAGNOSIS — K57.30 DIVERTICULA OF COLON: Primary | ICD-10-CM

## 2023-09-12 DIAGNOSIS — K57.32 DIVERTICULITIS OF COLON: ICD-10-CM

## 2023-09-12 LAB
ALBUMIN UR-MCNC: NEGATIVE MG/DL
APPEARANCE UR: CLEAR
BACTERIA #/AREA URNS HPF: ABNORMAL /HPF
BASOPHILS # BLD AUTO: 0.1 10E3/UL (ref 0–0.2)
BASOPHILS NFR BLD AUTO: 1 %
BILIRUB UR QL STRIP: NEGATIVE
COLOR UR AUTO: YELLOW
EOSINOPHIL # BLD AUTO: 0 10E3/UL (ref 0–0.7)
EOSINOPHIL NFR BLD AUTO: 0 %
ERYTHROCYTE [DISTWIDTH] IN BLOOD BY AUTOMATED COUNT: 12.8 % (ref 10–15)
GLUCOSE UR STRIP-MCNC: NEGATIVE MG/DL
HCT VFR BLD AUTO: 39.1 % (ref 35–47)
HGB BLD-MCNC: 13 G/DL (ref 11.7–15.7)
HGB UR QL STRIP: ABNORMAL
IMM GRANULOCYTES # BLD: 0 10E3/UL
IMM GRANULOCYTES NFR BLD: 0 %
KETONES UR STRIP-MCNC: NEGATIVE MG/DL
LEUKOCYTE ESTERASE UR QL STRIP: NEGATIVE
LYMPHOCYTES # BLD AUTO: 1.8 10E3/UL (ref 0.8–5.3)
LYMPHOCYTES NFR BLD AUTO: 16 %
MCH RBC QN AUTO: 29.1 PG (ref 26.5–33)
MCHC RBC AUTO-ENTMCNC: 33.2 G/DL (ref 31.5–36.5)
MCV RBC AUTO: 88 FL (ref 78–100)
MONOCYTES # BLD AUTO: 0.9 10E3/UL (ref 0–1.3)
MONOCYTES NFR BLD AUTO: 8 %
NEUTROPHILS # BLD AUTO: 8.3 10E3/UL (ref 1.6–8.3)
NEUTROPHILS NFR BLD AUTO: 75 %
NITRATE UR QL: NEGATIVE
PH UR STRIP: 6 [PH] (ref 5–7)
PLATELET # BLD AUTO: 226 10E3/UL (ref 150–450)
RBC # BLD AUTO: 4.47 10E6/UL (ref 3.8–5.2)
RBC #/AREA URNS AUTO: ABNORMAL /HPF
SP GR UR STRIP: 1.01 (ref 1–1.03)
UROBILINOGEN UR STRIP-ACNC: 0.2 E.U./DL
WBC # BLD AUTO: 11.1 10E3/UL (ref 4–11)
WBC #/AREA URNS AUTO: ABNORMAL /HPF

## 2023-09-12 PROCEDURE — 36415 COLL VENOUS BLD VENIPUNCTURE: CPT | Performed by: FAMILY MEDICINE

## 2023-09-12 PROCEDURE — 81001 URINALYSIS AUTO W/SCOPE: CPT | Performed by: FAMILY MEDICINE

## 2023-09-12 PROCEDURE — 85025 COMPLETE CBC W/AUTO DIFF WBC: CPT | Performed by: FAMILY MEDICINE

## 2023-09-12 PROCEDURE — 99204 OFFICE O/P NEW MOD 45 MIN: CPT | Performed by: FAMILY MEDICINE

## 2023-09-12 RX ORDER — PSEUDOEPHEDRINE HCL 30 MG
500 TABLET ORAL
COMMUNITY
Start: 2022-05-23

## 2023-09-12 RX ORDER — CIPROFLOXACIN 500 MG/1
500 TABLET, FILM COATED ORAL 2 TIMES DAILY
Qty: 20 TABLET | Refills: 0 | Status: SHIPPED | OUTPATIENT
Start: 2023-09-12 | End: 2023-09-22

## 2023-09-12 RX ORDER — METRONIDAZOLE 500 MG/1
500 TABLET ORAL 3 TIMES DAILY
Qty: 30 TABLET | Refills: 0 | Status: SHIPPED | OUTPATIENT
Start: 2023-09-12 | End: 2023-09-22

## 2023-09-12 NOTE — PROGRESS NOTES
Chief Complaint   Patient presents with    Urgent Care    Abdominal Pain    Nausea     Pt in clinic to have eval for abdominal pain and nausea.  Pt states she does have a HX of Diverticulitis.       Irma was seen today for urgent care, abdominal pain and nausea.    Diagnoses and all orders for this visit:    Diverticula of colon    LLQ abdominal pain  -     CBC with platelets and differential; Future  -     UA Macroscopic with reflex to Microscopic and Culture - Lab Collect; Future  -     CBC with platelets and differential  -     UA Macroscopic with reflex to Microscopic and Culture - Lab Collect  -     UA Microscopic with Reflex to Culture        D/d-bowel obstruction, colitis, UTI, diverticulitis, ischemic colitis, intra-abdominal abscess    I have a long discussion with patient about the lab result the white count was mildly elevated patient does need a CT because of the intensity of the pain patient was advised to follow-up in the ER she refused to go to the ER she would follow-up with her regular primary and consider getting a CT scan.  Very reluctant to go to the ER as her white count is mildly elevated and also her abdomen was quite tender patient prescribed antibiotic says to go to the ER she is allergic to amoxicillin hence treated with 2 antibiotic.  I did discuss with patient the possibility of abscess/perforation she said she would only go to ER if symptoms get worse.  SUBJECTIVE  HPI:  Irma Aldrich is a 58 year old female history of diverticuli who presents with the CC of abdominal/pelvic pain.    Pain is located in the LLQ area, with radiation to None.  The pain is characterized as sharp, and at worst is a level 6 on a scale of 1-10.  Pain has been present for 2 day(s) and is fluctuating.  EXACERBATING FACTORS: nothing  RELIEVING FACTORS: nothing.  ASSOCIATED SX: nausea.    Past Medical History:   Diagnosis Date    Chlamydia 1990    Endocervical polyp     polypectomy 2011    Fibroid      "myomectomy in 2000 (8 weeks postpartum)    Other and unspecified ovarian cyst     Varicella without mention of complication     Chickenpox     Current Outpatient Medications   Medication Sig Dispense Refill    calcium citrate 250 MG TABS Take 500 mg by mouth      sertraline (ZOLOFT) 50 MG tablet Take 1 tablet by mouth every morning      CALCIUM OR None Entered (Patient not taking: Reported on 9/12/2023)      MULTIPLE VITAMINS OR TABS None Entered (Patient not taking: Reported on 9/12/2023)       Social History     Tobacco Use    Smoking status: Never    Smokeless tobacco: Never   Substance Use Topics    Alcohol use: Yes     Comment: 2/weeks       ROS:  Review of systems negative except as stated above.    OBJECTIVE:  /73   Pulse 66   Temp 98.5  F (36.9  C) (Temporal)   Ht 1.638 m (5' 4.5\")   Wt 85.3 kg (188 lb)   SpO2 97%   BMI 31.77 kg/m    GENERAL APPEARANCE: healthy, alert and no distress  EYES: EOMI,  PERRL, conjunctiva clear  HENT: ear canals and TM's normal.  Nose and mouth without ulcers, erythema or lesions  NECK: supple, nontender, no lymphadenopathy  RESP: lungs clear to auscultation - no rales, rhonchi or wheezes  CV: regular rates and rhythm, normal S1 S2, no murmur noted  ABDOMEN:  soft, LLQ and Suprapubic area tender, no HSM or masses and bowel sounds normal  PSYCH: mentation appears normal    Yanna George MD       "

## 2023-11-13 ENCOUNTER — ANCILLARY PROCEDURE (OUTPATIENT)
Dept: MAMMOGRAPHY | Facility: CLINIC | Age: 58
End: 2023-11-13
Attending: FAMILY MEDICINE
Payer: COMMERCIAL

## 2023-11-13 DIAGNOSIS — Z12.31 VISIT FOR SCREENING MAMMOGRAM: ICD-10-CM

## 2023-11-13 PROCEDURE — 77063 BREAST TOMOSYNTHESIS BI: CPT | Mod: TC | Performed by: RADIOLOGY

## 2023-11-13 PROCEDURE — 77067 SCR MAMMO BI INCL CAD: CPT | Mod: TC | Performed by: RADIOLOGY

## 2024-10-19 ENCOUNTER — HEALTH MAINTENANCE LETTER (OUTPATIENT)
Age: 59
End: 2024-10-19